# Patient Record
Sex: FEMALE | Race: BLACK OR AFRICAN AMERICAN | Employment: FULL TIME | URBAN - METROPOLITAN AREA
[De-identification: names, ages, dates, MRNs, and addresses within clinical notes are randomized per-mention and may not be internally consistent; named-entity substitution may affect disease eponyms.]

---

## 2017-12-26 ENCOUNTER — HOSPITAL ENCOUNTER (OUTPATIENT)
Dept: MAMMOGRAPHY | Age: 47
Discharge: HOME | End: 2017-12-26
Attending: FAMILY MEDICINE
Payer: COMMERCIAL

## 2017-12-26 DIAGNOSIS — Z12.39 BREAST CANCER SCREENING: ICD-10-CM

## 2017-12-26 PROCEDURE — 77067 SCR MAMMO BI INCL CAD: CPT

## 2017-12-26 NOTE — PROGRESS NOTES
Dear Mrs. Jordan     Your recent mammogram showed :No mammographic evidence of malignancy.      Recommend annual mammogram in one year.  A reminder letter will be scheduled.     Thanks,  Dr. Christiano Lewis

## 2018-01-26 ENCOUNTER — HOSPITAL ENCOUNTER (OUTPATIENT)
Dept: GENERAL RADIOLOGY | Age: 48
Discharge: HOME | End: 2018-01-26
Attending: FAMILY MEDICINE
Payer: COMMERCIAL

## 2018-01-26 DIAGNOSIS — M21.961 METATARSAL DEFORMITY, RIGHT: ICD-10-CM

## 2018-01-26 DIAGNOSIS — M79.671 RIGHT FOOT PAIN: ICD-10-CM

## 2018-01-26 PROCEDURE — 73630 X-RAY EXAM OF FOOT: CPT

## 2018-01-28 NOTE — PROGRESS NOTES
Dear Ms. Jordan,    Your recent XR R.foot did not show any fracture. FINDINGS: There is no evidence of fracture or other acute bony abnormality in  the foot.  No bony lesions are seen. [place] is a small Achilles calcaneal spur    Thanks,  Dr. Hayley Harrell

## 2019-03-01 ENCOUNTER — HOSPITAL ENCOUNTER (OUTPATIENT)
Dept: MAMMOGRAPHY | Age: 49
Discharge: HOME | End: 2019-03-01
Attending: FAMILY MEDICINE
Payer: COMMERCIAL

## 2019-03-01 DIAGNOSIS — Z12.39 SCREENING BREAST EXAMINATION: ICD-10-CM

## 2019-03-01 PROCEDURE — 77067 SCR MAMMO BI INCL CAD: CPT

## 2019-03-01 NOTE — PROGRESS NOTES
Dear Mrs. Jordan     Your recent mammogram showed :No mammographic evidence of malignancy.      Recommend annual mammogram in one year.  A reminder letter will be scheduled.     Thanks,  Dr. Demarcus June

## 2020-07-24 ENCOUNTER — HOSPITAL ENCOUNTER (OUTPATIENT)
Dept: GENERAL RADIOLOGY | Age: 50
Discharge: HOME | End: 2020-07-24
Payer: COMMERCIAL

## 2020-07-24 DIAGNOSIS — M79.674 GREAT TOE PAIN, RIGHT: ICD-10-CM

## 2020-07-24 PROCEDURE — 73630 X-RAY EXAM OF FOOT: CPT

## 2020-08-19 NOTE — PROGRESS NOTES
Dear Ms. Jordan,    Your recent XR FINDINGS: There is a plantar calcaneal spur. There is 5 formation at the Achilles insertion. No acute fracture or dislocation. No additional bony abnormality.     IMPRESSION  IMPRESSION: No acute findings.     Thanks,  Dr. Alma Friedman

## 2020-10-22 ENCOUNTER — TRANSCRIBE ORDER (OUTPATIENT)
Dept: SCHEDULING | Age: 50
End: 2020-10-22

## 2020-10-22 DIAGNOSIS — Z12.31 SCREENING MAMMOGRAM FOR HIGH-RISK PATIENT: Primary | ICD-10-CM

## 2020-10-22 DIAGNOSIS — Z13.820 SCREENING FOR OSTEOPOROSIS: Primary | ICD-10-CM

## 2022-03-04 ENCOUNTER — HOSPITAL ENCOUNTER (OUTPATIENT)
Dept: LAB | Age: 52
Discharge: HOME | End: 2022-03-04

## 2022-03-04 PROCEDURE — 88305 TISSUE EXAM BY PATHOLOGIST: CPT

## 2022-10-31 ENCOUNTER — OFFICE VISIT (OUTPATIENT)
Dept: UROLOGY | Age: 52
End: 2022-10-31
Payer: COMMERCIAL

## 2022-10-31 DIAGNOSIS — R31.29 MICROSCOPIC HEMATURIA: Primary | ICD-10-CM

## 2022-10-31 DIAGNOSIS — R10.9 RIGHT FLANK PAIN: ICD-10-CM

## 2022-10-31 LAB
BILIRUBIN, URINE, POC: NEGATIVE
BLOOD URINE, POC: NEGATIVE
GLUCOSE URINE, POC: NEGATIVE
KETONES, URINE, POC: NEGATIVE
LEUKOCYTE ESTERASE, URINE, POC: NEGATIVE
NITRITE, URINE, POC: NEGATIVE
PH, URINE, POC: 6 (ref 4.6–8)
PROTEIN,URINE, POC: NEGATIVE
SPECIFIC GRAVITY, URINE, POC: 1.03 (ref 1–1.03)
URINALYSIS CLARITY, POC: ABNORMAL
URINALYSIS COLOR, POC: ABNORMAL
UROBILINOGEN, POC: ABNORMAL

## 2022-10-31 PROCEDURE — 81003 URINALYSIS AUTO W/O SCOPE: CPT | Performed by: NURSE PRACTITIONER

## 2022-10-31 PROCEDURE — 99203 OFFICE O/P NEW LOW 30 MIN: CPT | Performed by: NURSE PRACTITIONER

## 2022-10-31 RX ORDER — LISINOPRIL 10 MG/1
TABLET ORAL
COMMUNITY
Start: 2022-09-12

## 2022-10-31 RX ORDER — ATORVASTATIN CALCIUM 10 MG/1
TABLET, FILM COATED ORAL
COMMUNITY
Start: 2022-10-13

## 2022-10-31 RX ORDER — ZOLPIDEM TARTRATE 10 MG/1
TABLET ORAL
COMMUNITY
Start: 2022-10-29

## 2022-10-31 RX ORDER — ERGOCALCIFEROL (VITAMIN D2) 1250 MCG
CAPSULE ORAL
COMMUNITY
Start: 2020-08-28

## 2022-10-31 ASSESSMENT — ENCOUNTER SYMPTOMS
EYES NEGATIVE: 1
RESPIRATORY NEGATIVE: 1

## 2022-10-31 NOTE — PROGRESS NOTES
[facility]  [address] [facility]    [place] [address], [address] [facility]  [phone]          Eden Stone  : 1970    Chief Complaint   Patient presents with    New Patient    Hematuria     Flank pain           HPI     Lilliam Bower is a 46 y.o. female being seen today as a new pt referred by PCP for microhematuria. Upon chart review, I see one recent UA w trace blood. She denies gross hematuria. Reports being recently tx for UTI w antibiotics. Pt reports 2 yr hx of right flank pain. She feels it may be MS as she stands a lot w work. No hx of kidney stone. NSAIDS are minimally effective. Cr 0.9. No imaging of urinary tract on file. UA os clear today. Non smoker. No family hx of  Ca. No hysterectomy. . Past Medical History:   Diagnosis Date    Hyperlipidemia LDL goal <100 2016    Hypertension     Insomnia 2012    Vitamin D deficiency 2012     Past Surgical History:   Procedure Laterality Date    TUBAL LIGATION       Current Outpatient Medications   Medication Sig Dispense Refill    zolpidem (AMBIEN) 10 MG tablet TAKE 1 TABLET BY MOUTH NIGHTLY AS NEEDED FOR SLEEP      lisinopril (PRINIVIL;ZESTRIL) 10 MG tablet TAKE 1 TABLET (10 MG TOTAL) BY MOUTH IN THE MORNING. atorvastatin (LIPITOR) 10 MG tablet TAKE 1 TABLET (10 MG TOTAL) BY MOUTH IN THE MORNING.      ergocalciferol (ERGOCALCIFEROL) 1.25 MG (04191 UT) capsule Take by mouth every 7 days       No current facility-administered medications for this visit.      No Known Allergies  Social History     Socioeconomic History    Marital status:      Spouse name: Not on file    Number of children: Not on file    Years of education: Not on file    Highest education level: Not on file   Occupational History    Not on file   Tobacco Use    Smoking status: Never    Smokeless tobacco: Never   Substance and Sexual Activity    Alcohol use: Yes    Drug use: No    Sexual activity: Not on file Other Topics Concern    Not on file   Social History Narrative    Not on file     Social Determinants of Health     Financial Resource Strain: Not on file   Food Insecurity: Not on file   Transportation Needs: Not on file   Physical Activity: Not on file   Stress: Not on file   Social Connections: Not on file   Intimate Partner Violence: Not on file   Housing Stability: Not on file     Family History   Problem Relation Age of Onset    Heart Disease Maternal Grandmother         MI    Osteoporosis Father     Cancer Father         THROAT    Hypertension Father        Review of Systems  Constitutional: Negative  Skin: Negative skin ROS  Eyes: Eyes negative  ENT: HENT negative  Respiratory: Respiratory negative  Cardiovascular: Neg cardio ROSPositive for hypertension. GI: Neg GI ROS  Genitourinary: Genitourinary negativeNumber of pregnancies: 2. Number of births: 2.   Musculoskeletal: Musculoskeletal negative  Psychological: Neg psych ROS  Endocrine: Endocrine negative  Hem/Lymphatic: Hematologic/lymphatic negative    Urinalysis  UA - Dipstick  Results for orders placed or performed in visit on 10/31/22   AMB POC URINALYSIS DIP STICK AUTO W/O MICRO   Result Value Ref Range    Color (UA POC)      Clarity (UA POC)      Glucose, Urine, POC Negative Negative    Bilirubin, Urine, POC Negative Negative    KETONES, Urine, POC Negative Negative    Specific Gravity, Urine, POC 1.030 1.001 - 1.035    Blood (UA POC) Negative Negative    pH, Urine, POC 6.0 4.6 - 8.0    Protein, Urine, POC Negative Negative    Urobilinogen, POC 2 mg/dL     Nitrite, Urine, POC Negative Negative    Leukocyte Esterase, Urine, POC Negative Negative   Results for orders placed or performed in visit on 12/07/21   AMB POC URINALYSIS DIP STICK AUTO W/O MICRO   Result Value Ref Range    Color (UA POC) Yellow     Clarity (UA POC) Clear     Glucose, Urine, POC Negative Negative    Bilirubin, Urine, POC Negative Negative    Ketones, Urine, POC Negative Negative    Specific Gravity, Urine, POC 1.030 1.001 - 1.035 NA    Blood (UA POC) Negative Negative    pH, Urine, POC 5.5 4.6 - 8.0 NA    Protein, Urine, POC Negative Negative    Urobilinogen, POC 0.2 mg/dL 0.2 - 1    Nitrite, Urine, POC Negative Negative    Leukocyte Esterase, Urine, POC Negative Negative       PHYSICAL EXAM    General appearance - well appearing and in no distress  Mental status - alert, oriented to person, place, and time  Neck - supple, no significant adenopathy  Chest/Lung-  Quiet, even and easy respiratory effort without use of accessory muscles  Skin - normal coloration and turgor, no rashes        Assessment and Plan    ICD-10-CM    1. Microscopic hematuria  R31.29 AMB POC URINALYSIS DIP STICK AUTO W/O MICRO     CT ABDOMEN PELVIS RENAL STONE      2. Right flank pain  R10.9 CT ABDOMEN PELVIS RENAL STONE      Discussion today with pt. in regards to asymptomatic microscopic hematuria. We discussed potential causes such as infection, bladder tumor, renal tumor, nephrolithiasis, false positivity by dipstick, or idiopathic cause. In regards to work up, as per AUA guidelines, 3 microscopic UA's will be checked. Further imaging/workup (upper tract imaging, cystoscopy, cytology) would be scheduled IF UA reveals 3 or more RBC's per HPF. Send for CT. Will call results. FU pending CT. To call sooner if needed. Peyton Llamas is supervising physician today and he approves plan of care.

## 2022-12-22 ENCOUNTER — HOSPITAL ENCOUNTER (OUTPATIENT)
Dept: CT IMAGING | Age: 52
Discharge: HOME | End: 2022-12-22
Payer: COMMERCIAL

## 2022-12-22 DIAGNOSIS — R10.9 RIGHT FLANK PAIN: ICD-10-CM

## 2022-12-22 DIAGNOSIS — R31.29 MICROSCOPIC HEMATURIA: ICD-10-CM

## 2022-12-22 PROCEDURE — 74176 CT ABD & PELVIS W/O CONTRAST: CPT

## 2023-01-30 ENCOUNTER — OFFICE VISIT (OUTPATIENT)
Dept: ORTHOPEDIC SURGERY | Age: 53
End: 2023-01-30

## 2023-01-30 DIAGNOSIS — M79.671 RIGHT FOOT PAIN: Primary | ICD-10-CM

## 2023-01-30 DIAGNOSIS — M20.11 HALLUX VALGUS OF RIGHT FOOT: ICD-10-CM

## 2023-01-30 DIAGNOSIS — M20.5X1 ACQUIRED CLAW TOE OF RIGHT FOOT: ICD-10-CM

## 2023-01-30 RX ORDER — NAPROXEN 500 MG/1
500 TABLET ORAL 2 TIMES DAILY WITH MEALS
COMMUNITY
Start: 2022-06-08

## 2023-01-30 NOTE — PROGRESS NOTES
Name: Nora Stone  YOB: 1970  Gender: female  MRN: [id number]    Summary:     Right bunion and second and third claw toes with MTP synovitis     CC: New Patient (Right foot  x-rays taken in the office today)       HPI: Prabha Green is a 46 y.o. female who presents with New Patient (Right foot  x-rays taken in the office today)  . Patient returns back the office today with continued complaints of right forefoot pain. She has pain with all of her shoes now. Is beginning to cause back pain as well. History was obtained by Patient     ROS/Meds/PSH/PMH/FH/SH: I personally reviewed the patients standard intake form. Below are the pertinents    Tobacco:  reports that she has never smoked. She has never used smokeless tobacco.  Diabetes: None      Physical Examination:  Exam of the right foot and ankle shows a hallux valgus deformity with no TMT instability. She does have second and third claw toe deformities with the intractable plantar keratosis underneath the second and third metatarsal heads. She has palpable pulses and intact sensation. Imaging:   I independently interpreted XR taken today  Right foot XR: AP, Lateral, Oblique views     ICD-10-CM    1. Right foot pain  M79.671 XR FOOT RIGHT (MIN 3 VIEWS)      2. Hallux valgus of right foot  M20.11 22738 - SD DRAIN/INJECT SMALL JOINT/BURSA      3. Acquired claw toe of right foot  M20.5X1 99030 - SD DRAIN/INJECT SMALL JOINT/BURSA         Report: AP, lateral, oblique x-ray of the right foot demonstrates hallux valgus    Impression: Hallux valgus   Yousuf Joaquin III, MD           Assessment:   Hallux valgus and second and third claw toes with MTP synovitis    Treatment Plan:   4 This is a chronic illness/condition with exacerbation and progression  Treatment at this time: Minor Procedure: Injection done today: The patient understands the risks and complications associated with injection.  After sterile prep of the area, the Right big toe MTP joint was injected with 3 cc. of Xylocaine and 3 cc. of steroid. . 40mg Depo Medrol was the steroid used. Patient tolerated it well. I discussed the risk of infection and skin blanching. I told the be patient be careful about the symptoms of hyperglycemia such as GI distress, polyuria, excessive thirst and lethargy. If these symptoms occur they should present to an primary care doctor or urgent facility and Elective major surgery with procedural risk factors  Studies ordered: NO XR needed @ Next Visit    Weight-bearing status: WBAT        Return to work/work restrictions:  After surgery out of work 3 months  No medications given        Right chevron/Akin bunionectomy, right second and third PIP resection arthroplasties and open Weil osteotomies    Outpatient - 1-1/4 hours, c-arm, sagittal saw, K-wires , Raygoza plates & screws , Raygoza headless screws    Anesthesia -  Choice       The patient understands the diagnosis of bunions and claw toes, conservative and surgical treatment. R/C outlined including the risk of recurrence, risk of non-healing of skin and bone with/without infection,  potential loss/amputation of a toe(s) secondary to circulation loss, the risk of stiffness in the toes, and the overall risk of swelling that could be prolonged. The patient understands the use of internal and/or external k-wire type fixation and that it may take 6 months to a year before the patient can comfortably get back into regular/dress shoes. Generalized surgical risks and complications were discussed. The patient accepts and would like to proceed with surgery.

## 2023-02-16 ENCOUNTER — OFFICE VISIT (OUTPATIENT)
Dept: UROLOGY | Age: 53
End: 2023-02-16

## 2023-02-16 DIAGNOSIS — R10.9 RIGHT FLANK PAIN: ICD-10-CM

## 2023-02-16 DIAGNOSIS — R31.29 MICROSCOPIC HEMATURIA: Primary | ICD-10-CM

## 2023-02-16 LAB
BILIRUBIN, URINE, POC: NEGATIVE
BLOOD URINE, POC: NEGATIVE
GLUCOSE URINE, POC: NEGATIVE
KETONES, URINE, POC: NEGATIVE
LEUKOCYTE ESTERASE, URINE, POC: NEGATIVE
NITRITE, URINE, POC: NEGATIVE
PH, URINE, POC: 6.5 (ref 4.6–8)
PROTEIN,URINE, POC: NEGATIVE
SPECIFIC GRAVITY, URINE, POC: 1.02 (ref 1–1.03)
URINALYSIS CLARITY, POC: ABNORMAL
URINALYSIS COLOR, POC: ABNORMAL
UROBILINOGEN, POC: ABNORMAL

## 2023-02-16 ASSESSMENT — ENCOUNTER SYMPTOMS
EYES NEGATIVE: 1
RESPIRATORY NEGATIVE: 1

## 2023-02-16 NOTE — PROGRESS NOTES
[facility]  [address] [facility] [address], [address] [facility]  [phone]          Eliecer Stone  : 1970    Chief Complaint   Patient presents with    Follow-up    Hematuria     MICRO HEMATURIA          BIBIANA Rodriguez is a 46 y.o. female being seen today for microhematuria fu. Upon chart review, I see one recent UA w trace blood. She denies gross hematuria. tx for UTI around that time. Pt reports 2 yr hx of right flank pain. She feels it may be MS as she stands a lot w work. Sent for CT A/P in Dec 2022 and this was normal No hx of kidney stone. Cr 0.9.     UA  clear today. This is her second check today. She is completely asx. Non smoker. No family hx of  Ca. No hysterectomy. . Reports she may need foot surgery in near future. Past Medical History:   Diagnosis Date    Hyperlipidemia LDL goal <100 2016    Hypertension     Insomnia 2012    Vitamin D deficiency 2012     Past Surgical History:   Procedure Laterality Date    TUBAL LIGATION       Current Outpatient Medications   Medication Sig Dispense Refill    naproxen (NAPROSYN) 500 MG tablet Take 500 mg by mouth 2 times daily (with meals)      zolpidem (AMBIEN) 10 MG tablet TAKE 1 TABLET BY MOUTH NIGHTLY AS NEEDED FOR SLEEP      lisinopril (PRINIVIL;ZESTRIL) 10 MG tablet TAKE 1 TABLET (10 MG TOTAL) BY MOUTH IN THE MORNING. atorvastatin (LIPITOR) 10 MG tablet TAKE 1 TABLET (10 MG TOTAL) BY MOUTH IN THE MORNING.      ergocalciferol (ERGOCALCIFEROL) 1.25 MG (97625 UT) capsule Take by mouth every 7 days       No current facility-administered medications for this visit.      No Known Allergies  Social History     Socioeconomic History    Marital status:      Spouse name: Not on file    Number of children: Not on file    Years of education: Not on file    Highest education level: Not on file   Occupational History    Not on file   Tobacco Use    Smoking status: Never    Smokeless tobacco: Never   Substance and Sexual Activity    Alcohol use: Yes    Drug use: No    Sexual activity: Not on file   Other Topics Concern    Not on file   Social History Narrative    Not on file     Social Determinants of Health     Financial Resource Strain: Not on file   Food Insecurity: Not on file   Transportation Needs: Not on file   Physical Activity: Not on file   Stress: Not on file   Social Connections: Not on file   Intimate Partner Violence: Not on file   Housing Stability: Not on file     Family History   Problem Relation Age of Onset    Heart Disease Maternal Grandmother         MI    Osteoporosis Father     Cancer Father         THROAT    Hypertension Father        Review of Systems  Constitutional: Negative  Skin: Negative skin ROS  Eyes: Eyes negative  ENT: HENT negative  Respiratory: Respiratory negative  Cardiovascular: Neg cardio ROS  GI: Neg GI ROS  Genitourinary: Genitourinary negative  Musculoskeletal: Musculoskeletal negative  Psychological: Neg psych ROS  Endocrine: Endocrine negative  Hem/Lymphatic: Hematologic/lymphatic negative    Urinalysis  UA - Dipstick  Results for orders placed or performed in visit on 02/16/23   AMB POC URINALYSIS DIP STICK AUTO W/O MICRO   Result Value Ref Range    Color (UA POC)      Clarity (UA POC)      Glucose, Urine, POC Negative Negative    Bilirubin, Urine, POC Negative Negative    KETONES, Urine, POC Negative Negative    Specific Gravity, Urine, POC 1.025 1.001 - 1.035    Blood (UA POC) Negative Negative    pH, Urine, POC 6.5 4.6 - 8.0    Protein, Urine, POC Negative Negative    Urobilinogen, POC 2 mg/dL     Nitrite, Urine, POC Negative Negative    Leukocyte Esterase, Urine, POC Negative Negative   Results for orders placed or performed in visit on 12/07/21   AMB POC URINALYSIS DIP STICK AUTO W/O MICRO   Result Value Ref Range    Color (UA POC) Yellow     Clarity (UA POC) Clear     Glucose, Urine, POC Negative Negative Bilirubin, Urine, POC Negative Negative    Ketones, Urine, POC Negative Negative    Specific Gravity, Urine, POC 1.030 1.001 - 1.035 NA    Blood (UA POC) Negative Negative    pH, Urine, POC 5.5 4.6 - 8.0 NA    Protein, Urine, POC Negative Negative    Urobilinogen, POC 0.2 mg/dL 0.2 - 1    Nitrite, Urine, POC Negative Negative    Leukocyte Esterase, Urine, POC Negative Negative       UA - Micro  WBC - 0  RBC - 0  Bacteria - 0  Epith - 0    PHYSICAL EXAM    General appearance - well appearing and in no distress  Mental status - alert, oriented to person, place, and time  Neck - supple, no significant adenopathy  Chest/Lung-  Quiet, even and easy respiratory effort without use of accessory muscles  Skin - normal coloration and turgor, no rashes      Assessment and Plan    ICD-10-CM    1. Microscopic hematuria  R31.29 AMB POC URINALYSIS DIP STICK AUTO W/O MICRO      2. Right flank pain  R10.9 AMB POC URINALYSIS DIP STICK AUTO W/O MICRO       We discussed potential causes such as infection, bladder tumor, renal tumor, nephrolithiasis, false positivity by dipstick, or idiopathic cause. In regards to work up, as per AUA guidelines, 3 microscopic UA's will be checked. Further imaging/workup (upper tract imaging, cystoscopy, cytology) would be scheduled IF UA reveals 3 or more RBC's per HPF. Flank pain likely from gait abnormality. She reports upcoming surgery on foot. Will follow. ROV in 6 months for UA recheck. To call sooner if needed. Aissatou Waterman is supervising physician today and he approves plan of care.

## 2023-05-10 RX ORDER — BUPROPION HYDROCHLORIDE 100 MG/1
TABLET, EXTENDED RELEASE ORAL
COMMUNITY
Start: 2020-02-24

## 2024-03-13 ENCOUNTER — INITIAL CONSULT (OUTPATIENT)
Age: 54
End: 2024-03-13
Payer: COMMERCIAL

## 2024-03-13 VITALS
HEIGHT: 69 IN | BODY MASS INDEX: 27.85 KG/M2 | WEIGHT: 188 LBS | HEART RATE: 69 BPM | DIASTOLIC BLOOD PRESSURE: 88 MMHG | SYSTOLIC BLOOD PRESSURE: 138 MMHG

## 2024-03-13 DIAGNOSIS — R07.2 PRECORDIAL PAIN: Primary | ICD-10-CM

## 2024-03-13 PROCEDURE — 3075F SYST BP GE 130 - 139MM HG: CPT | Performed by: INTERNAL MEDICINE

## 2024-03-13 PROCEDURE — 99204 OFFICE O/P NEW MOD 45 MIN: CPT | Performed by: INTERNAL MEDICINE

## 2024-03-13 PROCEDURE — 3079F DIAST BP 80-89 MM HG: CPT | Performed by: INTERNAL MEDICINE

## 2024-03-13 PROCEDURE — 93000 ELECTROCARDIOGRAM COMPLETE: CPT | Performed by: INTERNAL MEDICINE

## 2024-03-13 RX ORDER — FLUTICASONE PROPIONATE 50 MCG
1 SPRAY, SUSPENSION (ML) NASAL DAILY
COMMUNITY
Start: 2024-02-20

## 2024-03-13 ASSESSMENT — ENCOUNTER SYMPTOMS
EYE PAIN: 0
EYES NEGATIVE: 1
ALLERGIC/IMMUNOLOGIC NEGATIVE: 1
BACK PAIN: 0
PHOTOPHOBIA: 0
GASTROINTESTINAL NEGATIVE: 1
SHORTNESS OF BREATH: 0
CHEST TIGHTNESS: 1
ABDOMINAL PAIN: 0

## 2024-03-13 NOTE — PROGRESS NOTES
[address] [place] [address], [place] [address]  PHONE: [phone]    Emma Stone  1970    SUBJECTIVE:   Emma Stone is a 53 y.o. female seen for initial evaluation of:      Chief Complaint   Patient presents with    Consultation    Chest Pain    Hyperlipidemia    Hypertension        Cardiac Hx (Reviewed and summarized by me):  1) Chest pain - left arm tingling early in the AM and late at night  2) HLD   1/11/24 - HDL 55, LDL 85, Trig 58, Ratio 2.78  3) HTN  4) no first degree relatives with heart issue    HPI:  53-year-old female referred for some atypical chest pain that started a few weeks ago.  She was waking with left arm pain and tingling and also came in the night.  She is not very active but does have a physical job working at the [employer] where she stands most of the day.  She suffers from hypertension and hyperlipidemia which are both treated by her primary care provider she has no significant family history of early coronary artery disease.    ECG: NSR normal axis, no ischemic changes (Independent review/interpretation by me)      Past Medical History, Past Surgical History, Family history, Social History, and Medications were all reviewed with the patient today and updated as necessary.       Current Outpatient Medications:     fluticasone (FLONASE) 50 MCG/ACT nasal spray, 1 spray by Nasal route daily, Disp: , Rfl:     buPROPion (WELLBUTRIN SR) 100 MG extended release tablet, Take by mouth daily (with breakfast), Disp: , Rfl:     zolpidem (AMBIEN) 10 MG tablet, TAKE 1 TABLET BY MOUTH NIGHTLY AS NEEDED FOR SLEEP, Disp: , Rfl:     lisinopril (PRINIVIL;ZESTRIL) 10 MG tablet, TAKE 1 TABLET (10 MG TOTAL) BY MOUTH IN THE MORNING., Disp: , Rfl:     atorvastatin (LIPITOR) 10 MG tablet, TAKE 1 TABLET (10 MG TOTAL) BY MOUTH IN THE MORNING., Disp: , Rfl:     ergocalciferol (ERGOCALCIFEROL) 1.25 MG (92531 UT) capsule, Take by mouth every 7 days, Disp: , Rfl:     naproxen

## 2024-04-12 ENCOUNTER — OFFICE VISIT (OUTPATIENT)
Age: 54
End: 2024-04-12
Payer: COMMERCIAL

## 2024-04-12 VITALS
WEIGHT: 194 LBS | HEIGHT: 69 IN | DIASTOLIC BLOOD PRESSURE: 86 MMHG | BODY MASS INDEX: 28.73 KG/M2 | SYSTOLIC BLOOD PRESSURE: 136 MMHG | HEART RATE: 76 BPM

## 2024-04-12 DIAGNOSIS — I10 ESSENTIAL HYPERTENSION: Primary | ICD-10-CM

## 2024-04-12 PROCEDURE — 99213 OFFICE O/P EST LOW 20 MIN: CPT | Performed by: INTERNAL MEDICINE

## 2024-04-12 PROCEDURE — 3075F SYST BP GE 130 - 139MM HG: CPT | Performed by: INTERNAL MEDICINE

## 2024-04-12 PROCEDURE — 3079F DIAST BP 80-89 MM HG: CPT | Performed by: INTERNAL MEDICINE

## 2024-04-12 RX ORDER — LISINOPRIL 20 MG/1
TABLET ORAL
COMMUNITY
Start: 2024-04-09

## 2024-04-12 ASSESSMENT — ENCOUNTER SYMPTOMS
ABDOMINAL PAIN: 0
GASTROINTESTINAL NEGATIVE: 1
CHEST TIGHTNESS: 0
BACK PAIN: 0
EYE PAIN: 0
EYES NEGATIVE: 1
ALLERGIC/IMMUNOLOGIC NEGATIVE: 1
RESPIRATORY NEGATIVE: 1
SHORTNESS OF BREATH: 0
PHOTOPHOBIA: 0

## 2024-04-12 NOTE — PROGRESS NOTES
[facility] CARDIOLOGY  [address] [place] [address], [address]  [place] [address]  PHONE: [phone]      24    NAME:  Emma Stone  : 1970  MRN: [id number]         SUBJECTIVE:   Emma Stone is a 54 y.o. female seen for follow up of:      Chief Complaint   Patient presents with    Results     NST    Hypertension        Cardiac Hx (Reviewed and summarized by me):  1) Chest pain - left arm tingling early in the AM and late at night  2) HLD              24 - HDL 55, LDL 85, Trig 58, Ratio 2.78  3) HTN  4) no first degree relatives with heart issue  5) Stress test 4/10/24 - LVEF 45% moderate risk with     HPI:  Stress test was abnormal with an abnormal ejection fraction and some EKG changes.  The perfusion portion however it was normal.  She has no history of heart failure no heart failure symptoms since we last saw her her chest pain symptoms have resolved.    Past Medical History, Past Surgical History, Family history, Social History, and Medications were all reviewed with the patient today and updated as necessary.       Current Outpatient Medications:     lisinopril (PRINIVIL;ZESTRIL) 20 MG tablet, TAKE 1 TABLET (20 MG TOTAL) BY MOUTH IN THE MORNING, Disp: , Rfl:     naproxen (NAPROSYN) 500 MG tablet, Take 1 tablet by mouth 2 times daily (with meals), Disp: , Rfl:     zolpidem (AMBIEN) 10 MG tablet, TAKE 1 TABLET BY MOUTH NIGHTLY AS NEEDED FOR SLEEP, Disp: , Rfl:     atorvastatin (LIPITOR) 10 MG tablet, TAKE 1 TABLET (10 MG TOTAL) BY MOUTH IN THE MORNING., Disp: , Rfl:     ergocalciferol (ERGOCALCIFEROL) 1.25 MG (96974 UT) capsule, Take by mouth every 7 days, Disp: , Rfl:   No Known Allergies  Past Medical History:   Diagnosis Date    Hyperlipidemia LDL goal <100 2016    Hypertension     Insomnia 2012    Vitamin D deficiency 2012     Past Surgical History:   Procedure Laterality Date    TUBAL LIGATION       Family History   Problem Relation Age of Onset

## 2024-06-06 ENCOUNTER — OFFICE VISIT (OUTPATIENT)
Age: 54
End: 2024-06-06
Payer: COMMERCIAL

## 2024-06-06 VITALS
BODY MASS INDEX: 28.44 KG/M2 | HEIGHT: 69 IN | DIASTOLIC BLOOD PRESSURE: 100 MMHG | SYSTOLIC BLOOD PRESSURE: 156 MMHG | WEIGHT: 192 LBS | HEART RATE: 80 BPM

## 2024-06-06 DIAGNOSIS — I10 ESSENTIAL HYPERTENSION: Primary | ICD-10-CM

## 2024-06-06 PROCEDURE — 99213 OFFICE O/P EST LOW 20 MIN: CPT | Performed by: INTERNAL MEDICINE

## 2024-06-06 PROCEDURE — 3080F DIAST BP >= 90 MM HG: CPT | Performed by: INTERNAL MEDICINE

## 2024-06-06 PROCEDURE — 3077F SYST BP >= 140 MM HG: CPT | Performed by: INTERNAL MEDICINE

## 2024-06-06 ASSESSMENT — ENCOUNTER SYMPTOMS
RESPIRATORY NEGATIVE: 1
EYES NEGATIVE: 1
EYE PAIN: 0
CHEST TIGHTNESS: 0
ABDOMINAL PAIN: 0
ALLERGIC/IMMUNOLOGIC NEGATIVE: 1
SHORTNESS OF BREATH: 0
PHOTOPHOBIA: 0
GASTROINTESTINAL NEGATIVE: 1
BACK PAIN: 0

## 2024-06-06 NOTE — PROGRESS NOTES
[facility] CARDIOLOGY  [address] [place] [address], [address]  [place] [address]  PHONE: [phone]      24    NAME:  Emma Stone  : 1970  MRN: [id number]         SUBJECTIVE:   Emma Stone is a 54 y.o. female seen for follow up of:      Chief Complaint   Patient presents with    Results     echo    Hypertension        Cardiac Hx (Reviewed and summarized by me):  1) Chest pain - left arm tingling early in the AM and late at night  2) HLD              24 - HDL 55, LDL 85, Trig 58, Ratio 2.78  3) HTN  4) no first degree relatives with heart issue  5) Stress test 4/10/24 - LVEF 45% moderate risk with normal perfusion  6) Echo 24 - LVEF 50-55%      HPI:  Echo confirmed normal ejection fraction.  She denies any cardiac symptoms.    Past Medical History, Past Surgical History, Family history, Social History, and Medications were all reviewed with the patient today and updated as necessary.       Current Outpatient Medications:     lisinopril (PRINIVIL;ZESTRIL) 20 MG tablet, TAKE 1 TABLET (20 MG TOTAL) BY MOUTH IN THE MORNING, Disp: , Rfl:     naproxen (NAPROSYN) 500 MG tablet, Take 1 tablet by mouth 2 times daily (with meals), Disp: , Rfl:     zolpidem (AMBIEN) 10 MG tablet, TAKE 1 TABLET BY MOUTH NIGHTLY AS NEEDED FOR SLEEP, Disp: , Rfl:     atorvastatin (LIPITOR) 10 MG tablet, TAKE 1 TABLET (10 MG TOTAL) BY MOUTH IN THE MORNING., Disp: , Rfl:     ergocalciferol (ERGOCALCIFEROL) 1.25 MG (52860 UT) capsule, Take by mouth every 7 days, Disp: , Rfl:   No Known Allergies  Past Medical History:   Diagnosis Date    Hyperlipidemia LDL goal <100 2016    Hypertension     Insomnia 2012    Vitamin D deficiency 2012     Past Surgical History:   Procedure Laterality Date    TUBAL LIGATION       Family History   Problem Relation Age of Onset    Heart Disease Maternal Grandmother         MI    Osteoporosis Father     Cancer Father         THROAT    Hypertension Father

## 2024-07-29 ENCOUNTER — OFFICE VISIT (OUTPATIENT)
Dept: ORTHOPEDIC SURGERY | Age: 54
End: 2024-07-29
Payer: COMMERCIAL

## 2024-07-29 DIAGNOSIS — M20.5X1 ACQUIRED CLAW TOE OF RIGHT FOOT: ICD-10-CM

## 2024-07-29 DIAGNOSIS — M20.11 HALLUX VALGUS OF RIGHT FOOT: Primary | ICD-10-CM

## 2024-07-29 PROCEDURE — 99214 OFFICE O/P EST MOD 30 MIN: CPT | Performed by: ORTHOPAEDIC SURGERY

## 2024-07-29 NOTE — PROGRESS NOTES
Name: Emma Stone  YOB: 1970  Gender: female  MRN: [id number]    Summary:   Right hallux valgus and second and third claw toe deformities with volar plate pain       CC: No chief complaint on file.       HPI: Emma Stone is a 54 y.o. female who presents with No chief complaint on file.  .  This patient presents back to the office today with continued complaints of right forefoot pain.  She is tried wide shoes and spacers for years now without much benefit.  The pain is affecting actives of daily being significantly.    History was obtained by Patient     ROS/Meds/PSH/PMH/FH/SH: I personally reviewed the patients standard intake form.  Below are the pertinents    Tobacco:  reports that she has never smoked. She has never used smokeless tobacco.  Diabetes: None      Physical Examination:    Exam of the right lower extremity shows a hallux valgus deformity with second and third fixed claw toe deformities.  There is a large IPK located at the second and third metatarsal heads.  She has palpable pulses and intact sensation without evidence of TMT instability.    Imaging:   Interpretation of imaging  Right foot XR: AP, Lateral, Oblique views     ICD-10-CM    1. Hallux valgus of right foot  M20.11 XR FOOT RIGHT (MIN 3 VIEWS)      2. Acquired claw toe of right foot  M20.5X1 XR FOOT RIGHT (MIN 3 VIEWS)         Report: AP, lateral, oblique x-ray of the right foot demonstrates hallux valgus    Impression: Hallux valgus   HEVER AREVALO III, MD           Assessment:   Right hallux valgus and second and third claw toes with volar fat pad atrophy    Treatment Plan:   4 This is a chronic illness/condition with exacerbation and progression  Treatment at this time: Elective major surgery with procedural risk factors  Studies ordered: NO XR needed @ Next Visit    Weight-bearing status: WBAT        Return to work/work restrictions:  After surgery out of work 12 weeks  No medications

## 2024-12-18 ENCOUNTER — OFFICE VISIT (OUTPATIENT)
Dept: ORTHOPEDIC SURGERY | Age: 54
End: 2024-12-18
Payer: COMMERCIAL

## 2024-12-18 DIAGNOSIS — M17.12 OSTEOARTHRITIS OF LEFT KNEE, UNSPECIFIED OSTEOARTHRITIS TYPE: ICD-10-CM

## 2024-12-18 DIAGNOSIS — M25.562 LEFT KNEE PAIN, UNSPECIFIED CHRONICITY: Primary | ICD-10-CM

## 2024-12-18 DIAGNOSIS — M17.12 OSTEOARTHRITIS OF LEFT KNEE, UNSPECIFIED OSTEOARTHRITIS TYPE: Primary | ICD-10-CM

## 2024-12-18 PROCEDURE — 20611 DRAIN/INJ JOINT/BURSA W/US: CPT | Performed by: PHYSICIAN ASSISTANT

## 2024-12-18 PROCEDURE — 99204 OFFICE O/P NEW MOD 45 MIN: CPT | Performed by: PHYSICIAN ASSISTANT

## 2024-12-18 RX ORDER — TRIAMCINOLONE ACETONIDE 40 MG/ML
40 INJECTION, SUSPENSION INTRA-ARTICULAR; INTRAMUSCULAR ONCE
Status: COMPLETED | OUTPATIENT
Start: 2024-12-18 | End: 2024-12-18

## 2024-12-18 RX ADMIN — TRIAMCINOLONE ACETONIDE 40 MG: 40 INJECTION, SUSPENSION INTRA-ARTICULAR; INTRAMUSCULAR at 10:13

## 2024-12-18 NOTE — PROGRESS NOTES
bilaterally.  Their judgment and insight are normal.  They are oriented to time, place and person.  Their memory is good and the mood and affect appropriate.    X-RAY: Views of the left knee are reviewed.  4 views standing reveal some joint space loss, eburnated bone, and osteophyte formation present.      X-ray impression:  Moderate osteoarthritis of the left knee.    IMPRESSION:    Diagnosis Orders   1. Left knee pain, unspecified chronicity  XR KNEE LEFT (MIN 4 VIEWS)      2. Osteoarthritis of left knee, unspecified osteoarthritis type        .    RECOMMENDATIONS:    Reviewed x-ray findings with the patient.  The concerns with functional limitations are increasing and response is variable with conservative measures. Surgery was discussed today with the patient.  She is not limited enough to warrant any type of surgical consideration.  We will additionally try injection therapies as we process management of this progressive and chronic condition.    Procedure Note: Time out was performed which included identifying the patient by name and date of birth.  The procedure site was identified with all present in agreement.   The left knee was prepped with alcohol and injected with 40 mg of Kenalog and 2 mL of 0.5 % marcaine.  [employer] US unit with a variable frequency (6.0-15.0 MHz) linear transducer was used to visualize the retropatellar fat pad, patella, patellar tendon, tibia, and to ensure proper intra-articular placement of medication.  Injection image was stored in the patient's permanent chart.  The injection was without event and I will follow them as scheduled.      Approximately 45 minutes was spent reviewing the medical record, imaging, performing history and physical examination, discussing the diagnosis and treatment plan with the patient, and completing documentation for this visit.

## 2025-01-24 RX ORDER — HYALURONATE SODIUM 10 MG/ML
20 SYRINGE (ML) INTRAARTICULAR ONCE
Status: CANCELLED | OUTPATIENT
Start: 2025-01-24

## 2025-01-29 ENCOUNTER — OFFICE VISIT (OUTPATIENT)
Dept: ORTHOPEDIC SURGERY | Age: 55
End: 2025-01-29
Payer: COMMERCIAL

## 2025-01-29 DIAGNOSIS — M17.12 OSTEOARTHRITIS OF LEFT KNEE, UNSPECIFIED OSTEOARTHRITIS TYPE: Primary | ICD-10-CM

## 2025-01-29 PROCEDURE — 99213 OFFICE O/P EST LOW 20 MIN: CPT | Performed by: ORTHOPAEDIC SURGERY

## 2025-01-29 NOTE — PROGRESS NOTES
Name: Emma Stone  YOB: 1970  Gender: female  MRN: [id number]    CC:   Chief Complaint   Patient presents with    Knee Pain     S/p left knee cortisone inj       HPI:  The pain has been relieved with the injection.  They are more mobile and happy.  The constant ache is gone.  Function is improved.    ROS:  As per HPI. Pertinent postives and negatives are addressed with the patient, particularly those related to musculoskeletal concerns.  Non-orthopaedic concerns were referred back to the primary care physician.      PMFSH:    Current Outpatient Medications:     lisinopril (PRINIVIL;ZESTRIL) 20 MG tablet, TAKE 1 TABLET (20 MG TOTAL) BY MOUTH IN THE MORNING, Disp: , Rfl:     naproxen (NAPROSYN) 500 MG tablet, Take 1 tablet by mouth 2 times daily (with meals), Disp: , Rfl:     zolpidem (AMBIEN) 10 MG tablet, TAKE 1 TABLET BY MOUTH NIGHTLY AS NEEDED FOR SLEEP, Disp: , Rfl:     atorvastatin (LIPITOR) 10 MG tablet, TAKE 1 TABLET (10 MG TOTAL) BY MOUTH IN THE MORNING., Disp: , Rfl:     ergocalciferol (ERGOCALCIFEROL) 1.25 MG (82362 UT) capsule, Take by mouth every 7 days, Disp: , Rfl:   No Known Allergies  Past Medical History:   Diagnosis Date    Hyperlipidemia LDL goal <100 4/5/2016    Hypertension     Insomnia 11/30/2012    Vitamin D deficiency 11/30/2012     .pmh  Past Surgical History:   Procedure Laterality Date    TUBAL LIGATION       Family History   Problem Relation Age of Onset    Heart Disease Maternal Grandmother         MI    Osteoporosis Father     Cancer Father         THROAT    Hypertension Father      Social History     Socioeconomic History    Marital status:      Spouse name: Not on file    Number of children: Not on file    Years of education: Not on file    Highest education level: Not on file   Occupational History    Not on file   Tobacco Use    Smoking status: Never    Smokeless tobacco: Never   Substance and Sexual Activity    Alcohol use: Yes    Drug use: No     no

## 2025-07-09 ENCOUNTER — OFFICE VISIT (OUTPATIENT)
Dept: ORTHOPEDIC SURGERY | Age: 55
End: 2025-07-09

## 2025-07-09 DIAGNOSIS — M17.12 OSTEOARTHRITIS OF LEFT KNEE, UNSPECIFIED OSTEOARTHRITIS TYPE: Primary | ICD-10-CM

## 2025-07-09 RX ORDER — TRIAMCINOLONE ACETONIDE 40 MG/ML
40 INJECTION, SUSPENSION INTRA-ARTICULAR; INTRAMUSCULAR ONCE
Status: COMPLETED | OUTPATIENT
Start: 2025-07-09 | End: 2025-07-09

## 2025-07-09 RX ADMIN — TRIAMCINOLONE ACETONIDE 40 MG: 40 INJECTION, SUSPENSION INTRA-ARTICULAR; INTRAMUSCULAR at 08:24

## 2025-07-09 NOTE — PROGRESS NOTES
Name: Emma Stone  YOB: 1970  Gender: female  MRN: [id number]        Current Outpatient Medications:     lisinopril (PRINIVIL;ZESTRIL) 20 MG tablet, TAKE 1 TABLET (20 MG TOTAL) BY MOUTH IN THE MORNING, Disp: , Rfl:     naproxen (NAPROSYN) 500 MG tablet, Take 1 tablet by mouth 2 times daily (with meals), Disp: , Rfl:     zolpidem (AMBIEN) 10 MG tablet, TAKE 1 TABLET BY MOUTH NIGHTLY AS NEEDED FOR SLEEP, Disp: , Rfl:     atorvastatin (LIPITOR) 10 MG tablet, TAKE 1 TABLET (10 MG TOTAL) BY MOUTH IN THE MORNING., Disp: , Rfl:     ergocalciferol (ERGOCALCIFEROL) 1.25 MG (77741 UT) capsule, Take by mouth every 7 days, Disp: , Rfl:   No Known Allergies    CC: Left knee pain    DIAGNOSIS:   Encounter Diagnosis   Name Primary?    Osteoarthritis of left knee, unspecified osteoarthritis type Yes        Impression: Osteoarthritis the left knee    Procedure: Kenalog injection with US guidance    Radiology Report:  [employer] US unit with a variable frequency (6.0-15.0 MHz) linear transducer was used to examine the intracondylar notch, retropatellar fat pad, patella tendon, patella, and tibia as well as to ensure adequate needle placement.  Injection image was obtained and placed in the patient's permanent chart.    Procedure Note: Time out was performed which included identifying the patient by name and date of birth.  The procedure site was identified with all present in agreement.  The left knee was prepped with alcohol. Then, under GE ultrasound guidance, the left knee was injected with 2 mL of 0.5% Marcaine and 40mg of Kenalog. The patient tolerated the procedure without difficulty.    Disposition: She will return on as-needed basis for additional injections.